# Patient Record
Sex: MALE | Race: BLACK OR AFRICAN AMERICAN | NOT HISPANIC OR LATINO | Employment: FULL TIME | URBAN - METROPOLITAN AREA
[De-identification: names, ages, dates, MRNs, and addresses within clinical notes are randomized per-mention and may not be internally consistent; named-entity substitution may affect disease eponyms.]

---

## 2017-04-11 DIAGNOSIS — M25.511 ACUTE PAIN OF RIGHT SHOULDER: Primary | ICD-10-CM

## 2017-04-13 ENCOUNTER — OFFICE VISIT (OUTPATIENT)
Dept: ORTHOPEDICS | Facility: CLINIC | Age: 57
End: 2017-04-13
Payer: OTHER MISCELLANEOUS

## 2017-04-13 ENCOUNTER — HOSPITAL ENCOUNTER (OUTPATIENT)
Dept: RADIOLOGY | Facility: HOSPITAL | Age: 57
Discharge: HOME | End: 2017-04-13
Attending: ORTHOPAEDIC SURGERY
Payer: OTHER MISCELLANEOUS

## 2017-04-13 VITALS — WEIGHT: 197 LBS | HEIGHT: 69 IN | BODY MASS INDEX: 29.18 KG/M2

## 2017-04-13 DIAGNOSIS — M25.511 ACUTE PAIN OF RIGHT SHOULDER: ICD-10-CM

## 2017-04-13 DIAGNOSIS — M77.8 RIGHT SHOULDER TENDINITIS: Primary | ICD-10-CM

## 2017-04-13 PROCEDURE — 73030 X-RAY EXAM OF SHOULDER: CPT | Mod: 26,RT,, | Performed by: RADIOLOGY

## 2017-04-13 PROCEDURE — 99999 PR PBB SHADOW E&M-EST. PATIENT-LVL III: CPT | Mod: PBBFAC,,, | Performed by: ORTHOPAEDIC SURGERY

## 2017-04-13 PROCEDURE — 73030 X-RAY EXAM OF SHOULDER: CPT | Mod: TC,PO,RT

## 2017-04-13 PROCEDURE — 99203 OFFICE O/P NEW LOW 30 MIN: CPT | Mod: S$GLB,,, | Performed by: ORTHOPAEDIC SURGERY

## 2017-04-13 RX ORDER — ACETAMINOPHEN AND CODEINE PHOSPHATE 300; 30 MG/1; MG/1
1 TABLET ORAL
Status: ON HOLD | COMMUNITY
End: 2017-05-23 | Stop reason: HOSPADM
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
## 2017-04-13 NOTE — MR AVS SNAPSHOT
"    [facility]  [address]  [place] [address]  Phone: [phone]                  Devon Leon   2017 10:00 AM   Office Visit    Description:  Male : 1960   Provider:  Isaac Carpenter MD   Department:  [place] [facility]           Reason for Visit     Right Shoulder - Pain           Diagnoses this Visit        Comments    Right shoulder tendinitis    -  Primary            To Do List           Future Appointments        Provider Department Dept Phone    2017 10:00 AM Isaac Carpenter MD [facility] [phone]      Goals (5 Years of Data)     None      Follow-Up and Disposition     Return in about 3 weeks (around 2017).      [facility] [place] [facility]     [facility] -  Assistance  Unless otherwise directed by your provider, please contact [facility], our nurse care line that is available for  assistance.     Registered nurses in the [facility] provide: appointment scheduling, clinical advisement, health education, and other advisory services.  Call: [phone] (toll free)               Medications           Message regarding Medications     Verify the changes and/or additions to your medication regime listed below are the same as discussed with your clinician today.  If any of these changes or additions are incorrect, please notify your healthcare provider.             Verify that the below list of medications is an accurate representation of the medications you are currently taking.  If none reported, the list may be blank. If incorrect, please contact your healthcare provider. Carry this list with you in case of emergency.           Current Medications     acetaminophen-codeine 300-30mg (TYLENOL #3) 300-30 mg Tab Take 1 tablet by mouth every 6 to 8 hours as needed.           Clinical Reference Information           Your Vitals Were     Height Weight BMI          5' 9" (1.753 m) 89.4 kg (197 lb) 29.09 " kg/m2        Allergies as of 4/13/2017     No Known Allergies      Immunizations Administered on Date of Encounter - 4/13/2017     None      Orders Placed During Today's Visit      Normal Orders This Visit    Ambulatory Referral to Physical/Occupational Therapy       [facility] Sign-Up     Activating your [facility] account is as easy as 1-2-3!     1) Visit [email or web address], select Sign Up Now, enter this activation code and your date of birth, then select Next.  [email or web address]  Expires: 5/28/2017 11:00 AM      2) Create a username and password to use when you visit [facility] in the future and select a security question in case you lose your password and select Next.    3) Enter your e-mail address and click Sign Up!    Additional Information  If you have questions, please e-mail [email or web address] or call [phone] to talk to our [facility] staff. Remember, [facility] is NOT to be used for urgent needs. For medical emergencies, dial 911.         Language Assistance Services     ATTENTION: Language assistance services are available, free of charge. Please call [phone].      ATENCIÓN: Si habla español, tiene a russ disposición servicios gratuitos de asistencia lingüística. Llame al [phone].     CHÚ Ý: N?u b?n nói Ti?ng Vi?t, có các d?ch v? h? tr? ngôn ng? mi?n phí dành cho b?n. G?i s? [phone].         [facility] complies with applicable Federal civil rights laws and does not discriminate on the basis of race, color, national origin, age, disability, or sex.

## 2017-05-04 ENCOUNTER — HOSPITAL ENCOUNTER (OUTPATIENT)
Dept: RADIOLOGY | Facility: HOSPITAL | Age: 57
Discharge: HOME | End: 2017-05-04
Attending: ORTHOPAEDIC SURGERY
Payer: OTHER MISCELLANEOUS

## 2017-05-04 ENCOUNTER — OFFICE VISIT (OUTPATIENT)
Dept: ORTHOPEDICS | Facility: CLINIC | Age: 57
End: 2017-05-04
Payer: OTHER MISCELLANEOUS

## 2017-05-04 VITALS — BODY MASS INDEX: 29.18 KG/M2 | WEIGHT: 197 LBS | HEIGHT: 69 IN

## 2017-05-04 DIAGNOSIS — M25.511 ACUTE PAIN OF RIGHT SHOULDER: Primary | ICD-10-CM

## 2017-05-04 DIAGNOSIS — M25.511 ACUTE PAIN OF RIGHT SHOULDER: ICD-10-CM

## 2017-05-04 DIAGNOSIS — M75.41 IMPINGEMENT SYNDROME OF RIGHT SHOULDER: ICD-10-CM

## 2017-05-04 PROCEDURE — 99213 OFFICE O/P EST LOW 20 MIN: CPT | Mod: S$GLB,,, | Performed by: ORTHOPAEDIC SURGERY

## 2017-05-04 PROCEDURE — 73221 MRI JOINT UPR EXTREM W/O DYE: CPT | Mod: TC,PO,RT

## 2017-05-04 PROCEDURE — 73221 MRI JOINT UPR EXTREM W/O DYE: CPT | Mod: 26,RT,, | Performed by: RADIOLOGY

## 2017-05-04 PROCEDURE — 99999 PR PBB SHADOW E&M-EST. PATIENT-LVL II: CPT | Mod: 25,PBBFAC,, | Performed by: ORTHOPAEDIC SURGERY

## 2017-05-04 NOTE — PROGRESS NOTES
"DATE: 5/4/2017  PATIENT: Devon Leon    Attending Physician: Isaac Carpenter M.D.    HISTORY:  Devon Leon is a 57 y.o. male who returns for follow up evaluation of  his right shoulder.  He was seen on 4/13/17 and diagnosed with traumatic impingement.  He has been going to physical therapy and notes only minimal improvement.  He states the first therapy sessions hurt significantly and the remaining sessions have not helped.  Pain is reported at 8/10 today.  He is remain out of work since his injury.    PMH/PSH/FamHx/SocHx:  Reviewed and unchanged from prior visit    ROS:  Constitution: Negative for chills, fever, and sweats. Negative for unexplained weight loss.  HENT: Negative for headaches and blurry vision.   Cardiovascular: Negative for chest pain, irregular heartbeat, leg swelling and palpitations.   Respiratory: Negative for cough and shortness of breath.   Gastrointestinal: Negative for abdominal pain, heartburn, nausea and vomiting.   Genitourinary: Negative for bladder incontinence and dysuria.   Musculoskeletal: Negative for systemic arthritis, joint swelling, muscle weakness and myalgias.   Neurological: Negative for numbness.   Psychiatric/Behavioral: Negative for depression.   Endocrine: Negative for polyuria.   Hematologic/Lymphatic: Negative for bleeding disorders.  Skin: Negative for poor wound healing.       EXAM:  Ht 5' 9" (1.753 m)  Wt 89.4 kg (197 lb)  BMI 29.09 kg/m2  Devon Leon is a well developed, well nourished male in no acute distress. Physical examination of the right shoulder evaluated the following:    Inspection, palpation and ROM of the cervical spine  Disc compression testing bilaterally  Inspection for swelling, ecchymosis, erythema, deformity and atrophy  Tenderness to palpation of the soft tissue and bony structures  Active and passive range of motion  Sensation of the shoulder and upper extremity  Motor strength in the deltoid, supraspinatus, internal rotators and " external rotators  Impingement, apprehension, relocation and Speed's tests  Upper extremity vascular exam (skin temp,color, capillary refill)  Inspection for pseudomotor signs    Remarkable findings included:  Mild diffuse tenderness about the shoulder.  Range of motion full to forward elevation, abduction, internal and external rotation.  Motor strength nearly 5/5 in the supraspinatus and 5 minus/5 in the external rotators.  Markedly impingement sign still on exam        IMAGING:   No x-rays are performed today.    ASSESSMENT:  Traumatic impingement, possible rotator cuff tear right shoulder    PLAN:  The implications of the patient's evolution of symptoms and findings were explained to the patient in detail.  As Devon has not responded to therapy, I recommend an MRI to rule out a small full-thickness rotator cuff tear.   He'll remain out of work pending MRI results.  Therapy will also be placed on hold pending MRI results.Follow-up after MRI has been performed discussed results and further treatment recommendations.           This note was dictated using voice recognition software and may contain grammatical errors

## 2017-05-08 ENCOUNTER — OFFICE VISIT (OUTPATIENT)
Dept: ORTHOPEDICS | Facility: CLINIC | Age: 57
End: 2017-05-08
Payer: OTHER MISCELLANEOUS

## 2017-05-08 VITALS — WEIGHT: 197 LBS | BODY MASS INDEX: 29.18 KG/M2 | HEIGHT: 69 IN

## 2017-05-08 DIAGNOSIS — M75.121 COMPLETE TEAR OF RIGHT ROTATOR CUFF: Primary | ICD-10-CM

## 2017-05-08 PROCEDURE — 99999 PR PBB SHADOW E&M-EST. PATIENT-LVL III: CPT | Mod: PBBFAC,,, | Performed by: ORTHOPAEDIC SURGERY

## 2017-05-08 PROCEDURE — 99214 OFFICE O/P EST MOD 30 MIN: CPT | Mod: S$GLB,,, | Performed by: ORTHOPAEDIC SURGERY

## 2017-05-08 RX ORDER — MUPIROCIN 20 MG/G
1 OINTMENT TOPICAL
Status: CANCELLED | OUTPATIENT
Start: 2017-05-08

## 2017-05-08 NOTE — PROGRESS NOTES
"DATE: 5/8/2017  PATIENT: Devon Leon    Attending Physician: Isaac Carpenter M.D.    HISTORY:  Devon Leon is a 57 y.o. male who returns for follow up evaluation of  his right shoulder.  He did undergo an MRI which showed a retracted full-thickness rotator cuff tear.  He reports that his symptoms are unchanged and pain is still reported at 8/10 today.  He has remained out of work since his injury.    PMH/PSH/FamHx/SocHx:  Reviewed and unchanged from prior visit    ROS:  Constitution: Negative for chills, fever, and sweats. Negative for unexplained weight loss.  HENT: Negative for headaches and blurry vision.   Cardiovascular: Negative for chest pain, irregular heartbeat, leg swelling and palpitations.   Respiratory: Negative for cough and shortness of breath.   Gastrointestinal: Negative for abdominal pain, heartburn, nausea and vomiting.   Genitourinary: Negative for bladder incontinence and dysuria.   Musculoskeletal: Negative for systemic arthritis, joint swelling, muscle weakness and myalgias.   Neurological: Negative for numbness.   Psychiatric/Behavioral: Negative for depression.   Endocrine: Negative for polyuria.   Hematologic/Lymphatic: Negative for bleeding disorders.  Skin: Negative for poor wound healing.       EXAM:  Ht 5' 9" (1.753 m)  Wt 89.4 kg (197 lb)  BMI 29.09 kg/m2  Consitiutional: Well developed, well nourished male in no acute distress.  HEENT: Normocephalic, atraumatic.   Neck: Supple.  Chest: Breath sounds equal.  Cor: Regular, rate and rhythm.   Abdomen: Soft, nontender, nondistended. Without rebound or guarding.  Neuro: Alert, oriented x3. Nonfocal.   Rectal/GYN: Deferred.  Physical examination of the right shoulder evaluated the following:     Inspection, palpation and ROM of the cervical spine  Disc compression testing bilaterally  Inspection for swelling, ecchymosis, erythema, deformity and atrophy  Tenderness to palpation of the soft tissue and bony structures  Active and " passive range of motion  Sensation of the shoulder and upper extremity  Motor strength in the deltoid, supraspinatus, internal rotators and external rotators  Impingement, apprehension, relocation and Speed's tests  Upper extremity vascular exam (skin temp,color, capillary refill)  Inspection for pseudomotor signs     Remarkable findings included:  Mild diffuse tenderness about the shoulder. Range of motion full to forward elevation, abduction, internal and external rotation. Motor strength nearly 5/5 in the supraspinatus and 5 minus/5 in the external rotators.  impingement sign still present on exam       IMAGING:   MRI is personally reviewed and consistent with a report.  Retracted full-thickness rotator cuff tear identified    ASSESSMENT:  Full thickness rotator cuff tear right shoulder    PLAN:  The implications of the patient's evolution of symptoms and findings were explained to the patient in detail.. given the MRI findings and acute nature of the injury, I've recommended arthroscopic rotator cuff repair possible arthroscopic subacromial decompression right shoulder. The surgical procedure including potential risks and benefits was discussed in detail. Specific risks discussed included but were not limited to: infection, the possibility of a negative arthroscopic exam, arthroscopy failing to reveal any surgically treatable abnormalities (such as arthritic changes), failure to relieve symptoms, recurrence, nerve injury resulting in permanent numbness or weakness, blood vessel damage requiring repair and/or hospitalization, permanent stiffness, failure to achieve full joint mobility, permanent weakness, extensive and time consuming therapy, deep venous thrombosis and pulmonary embolism, and anesthesia related risks to be discussed with the anesthesiologist.. All questions answered and the patient wishes to  proceed with arthroscopy.  Informed consent obtained and signed.  He'll remain out of work.  Follow-up at  the time of surgery.          This note was dictated using voice recognition software and may contain grammatical errors

## 2017-05-22 ENCOUNTER — ANESTHESIA EVENT (OUTPATIENT)
Dept: SURGERY | Facility: HOSPITAL | Age: 57
End: 2017-05-22
Payer: OTHER MISCELLANEOUS

## 2017-05-23 ENCOUNTER — HOSPITAL ENCOUNTER (OUTPATIENT)
Facility: HOSPITAL | Age: 57
Discharge: HOME | End: 2017-05-23
Attending: ORTHOPAEDIC SURGERY | Admitting: ORTHOPAEDIC SURGERY
Payer: OTHER MISCELLANEOUS

## 2017-05-23 ENCOUNTER — ANESTHESIA (OUTPATIENT)
Dept: SURGERY | Facility: HOSPITAL | Age: 57
End: 2017-05-23
Payer: OTHER MISCELLANEOUS

## 2017-05-23 DIAGNOSIS — M75.121 COMPLETE TEAR OF RIGHT ROTATOR CUFF: ICD-10-CM

## 2017-05-23 DIAGNOSIS — M75.41 IMPINGEMENT SYNDROME OF RIGHT SHOULDER: Primary | ICD-10-CM

## 2017-05-23 DIAGNOSIS — M75.101 ROTATOR CUFF TEAR, RIGHT: ICD-10-CM

## 2017-05-23 PROCEDURE — 63600175 PHARM REV CODE 636 W HCPCS: Mod: PO | Performed by: NURSE ANESTHETIST, CERTIFIED REGISTERED

## 2017-05-23 PROCEDURE — 37000009 HC ANESTHESIA EA ADD 15 MINS: Mod: PO | Performed by: ORTHOPAEDIC SURGERY

## 2017-05-23 PROCEDURE — 63600175 PHARM REV CODE 636 W HCPCS: Mod: PO | Performed by: ORTHOPAEDIC SURGERY

## 2017-05-23 PROCEDURE — D9220A PRA ANESTHESIA: Mod: ,,, | Performed by: ANESTHESIOLOGY

## 2017-05-23 PROCEDURE — 36000710: Mod: PO | Performed by: ORTHOPAEDIC SURGERY

## 2017-05-23 PROCEDURE — C1713 ANCHOR/SCREW BN/BN,TIS/BN: HCPCS | Mod: PO | Performed by: ORTHOPAEDIC SURGERY

## 2017-05-23 PROCEDURE — 25000003 PHARM REV CODE 250: Mod: PO | Performed by: ANESTHESIOLOGY

## 2017-05-23 PROCEDURE — 25000003 PHARM REV CODE 250: Mod: PO | Performed by: NURSE ANESTHETIST, CERTIFIED REGISTERED

## 2017-05-23 PROCEDURE — 63600175 PHARM REV CODE 636 W HCPCS: Mod: PO | Performed by: ANESTHESIOLOGY

## 2017-05-23 PROCEDURE — 76942 ECHO GUIDE FOR BIOPSY: CPT | Mod: 26,,, | Performed by: ANESTHESIOLOGY

## 2017-05-23 PROCEDURE — 25000003 PHARM REV CODE 250: Mod: PO | Performed by: ORTHOPAEDIC SURGERY

## 2017-05-23 PROCEDURE — 64416 NJX AA&/STRD BRCH PL NFS IMG: CPT | Mod: 59,RT,, | Performed by: ANESTHESIOLOGY

## 2017-05-23 PROCEDURE — 29826 SHO ARTHRS SRG DECOMPRESSION: CPT | Mod: RT,,, | Performed by: ORTHOPAEDIC SURGERY

## 2017-05-23 PROCEDURE — 71000033 HC RECOVERY, INTIAL HOUR: Mod: PO | Performed by: ORTHOPAEDIC SURGERY

## 2017-05-23 PROCEDURE — 27201423 OPTIME MED/SURG SUP & DEVICES STERILE SUPPLY: Mod: PO | Performed by: ORTHOPAEDIC SURGERY

## 2017-05-23 PROCEDURE — 27200664 HC NERVE BLOCK COMPLETE KIT: Mod: PO | Performed by: ANESTHESIOLOGY

## 2017-05-23 PROCEDURE — 37000008 HC ANESTHESIA 1ST 15 MINUTES: Mod: PO | Performed by: ORTHOPAEDIC SURGERY

## 2017-05-23 PROCEDURE — 76942 ECHO GUIDE FOR BIOPSY: CPT | Mod: PO | Performed by: ANESTHESIOLOGY

## 2017-05-23 PROCEDURE — 29827 SHO ARTHRS SRG RT8TR CUF RPR: CPT | Mod: RT,,, | Performed by: ORTHOPAEDIC SURGERY

## 2017-05-23 PROCEDURE — 36000711: Mod: PO | Performed by: ORTHOPAEDIC SURGERY

## 2017-05-23 DEVICE — ANCHOR HEALICOIL 5.5MM: Type: IMPLANTABLE DEVICE | Site: SHOULDER | Status: FUNCTIONAL

## 2017-05-23 DEVICE — ANCHOR SUTURE POPLOK 4.5MM: Type: IMPLANTABLE DEVICE | Site: SHOULDER | Status: FUNCTIONAL

## 2017-05-23 RX ORDER — FENTANYL CITRATE 50 UG/ML
50 INJECTION, SOLUTION INTRAMUSCULAR; INTRAVENOUS EVERY 5 MIN PRN
Status: DISCONTINUED | OUTPATIENT
Start: 2017-05-23 | End: 2017-05-23 | Stop reason: HOSPADM

## 2017-05-23 RX ORDER — KETAMINE HYDROCHLORIDE 100 MG/ML
INJECTION, SOLUTION INTRAMUSCULAR; INTRAVENOUS
Status: DISCONTINUED | OUTPATIENT
Start: 2017-05-23 | End: 2017-05-23

## 2017-05-23 RX ORDER — SODIUM CHLORIDE, SODIUM LACTATE, POTASSIUM CHLORIDE, CALCIUM CHLORIDE 600; 310; 30; 20 MG/100ML; MG/100ML; MG/100ML; MG/100ML
INJECTION, SOLUTION INTRAVENOUS CONTINUOUS
Status: DISCONTINUED | OUTPATIENT
Start: 2017-05-23 | End: 2017-05-23 | Stop reason: HOSPADM

## 2017-05-23 RX ORDER — EPINEPHRINE 1 MG/ML
INJECTION INTRAMUSCULAR; INTRAVENOUS; SUBCUTANEOUS
Status: DISCONTINUED | OUTPATIENT
Start: 2017-05-23 | End: 2017-05-23 | Stop reason: HOSPADM

## 2017-05-23 RX ORDER — GLYCOPYRROLATE 0.2 MG/ML
INJECTION INTRAMUSCULAR; INTRAVENOUS
Status: DISCONTINUED | OUTPATIENT
Start: 2017-05-23 | End: 2017-05-23

## 2017-05-23 RX ORDER — NEOSTIGMINE METHYLSULFATE 1 MG/ML
INJECTION, SOLUTION INTRAVENOUS
Status: DISCONTINUED | OUTPATIENT
Start: 2017-05-23 | End: 2017-05-23

## 2017-05-23 RX ORDER — FENTANYL CITRATE 50 UG/ML
25 INJECTION, SOLUTION INTRAMUSCULAR; INTRAVENOUS EVERY 5 MIN PRN
Status: DISCONTINUED | OUTPATIENT
Start: 2017-05-23 | End: 2017-05-23 | Stop reason: HOSPADM

## 2017-05-23 RX ORDER — FENTANYL CITRATE 50 UG/ML
INJECTION, SOLUTION INTRAMUSCULAR; INTRAVENOUS
Status: DISCONTINUED | OUTPATIENT
Start: 2017-05-23 | End: 2017-05-23

## 2017-05-23 RX ORDER — OXYCODONE AND ACETAMINOPHEN 10; 325 MG/1; MG/1
1 TABLET ORAL
Qty: 60 TABLET | Refills: 0 | Status: SHIPPED | OUTPATIENT
Start: 2017-05-23

## 2017-05-23 RX ORDER — LIDOCAINE HCL/PF 100 MG/5ML
SYRINGE (ML) INTRAVENOUS
Status: DISCONTINUED | OUTPATIENT
Start: 2017-05-23 | End: 2017-05-23

## 2017-05-23 RX ORDER — ROPIVACAINE HYDROCHLORIDE 5 MG/ML
INJECTION, SOLUTION EPIDURAL; INFILTRATION; PERINEURAL
Status: DISCONTINUED | OUTPATIENT
Start: 2017-05-23 | End: 2017-05-23

## 2017-05-23 RX ORDER — LIDOCAINE HYDROCHLORIDE 10 MG/ML
1 INJECTION, SOLUTION EPIDURAL; INFILTRATION; INTRACAUDAL; PERINEURAL ONCE AS NEEDED
Status: COMPLETED | OUTPATIENT
Start: 2017-05-23 | End: 2017-05-23

## 2017-05-23 RX ORDER — ROCURONIUM BROMIDE 10 MG/ML
INJECTION, SOLUTION INTRAVENOUS
Status: DISCONTINUED | OUTPATIENT
Start: 2017-05-23 | End: 2017-05-23

## 2017-05-23 RX ORDER — ACETAMINOPHEN 10 MG/ML
INJECTION, SOLUTION INTRAVENOUS
Status: DISCONTINUED | OUTPATIENT
Start: 2017-05-23 | End: 2017-05-23

## 2017-05-23 RX ORDER — OXYCODONE HYDROCHLORIDE 5 MG/1
10 TABLET ORAL EVERY 4 HOURS PRN
Status: DISCONTINUED | OUTPATIENT
Start: 2017-05-23 | End: 2017-05-23 | Stop reason: HOSPADM

## 2017-05-23 RX ORDER — DEXAMETHASONE SODIUM PHOSPHATE 4 MG/ML
8 INJECTION, SOLUTION INTRA-ARTICULAR; INTRALESIONAL; INTRAMUSCULAR; INTRAVENOUS; SOFT TISSUE
Status: COMPLETED | OUTPATIENT
Start: 2017-05-23 | End: 2017-05-23

## 2017-05-23 RX ORDER — MUPIROCIN 20 MG/G
1 OINTMENT TOPICAL
Status: COMPLETED | OUTPATIENT
Start: 2017-05-23 | End: 2017-05-23

## 2017-05-23 RX ORDER — MIDAZOLAM HYDROCHLORIDE 5 MG/ML
2 INJECTION INTRAMUSCULAR; INTRAVENOUS ONCE AS NEEDED
Status: COMPLETED | OUTPATIENT
Start: 2017-05-23 | End: 2017-05-23

## 2017-05-23 RX ORDER — SODIUM CHLORIDE 0.9 % (FLUSH) 0.9 %
3 SYRINGE (ML) INJECTION
Status: DISCONTINUED | OUTPATIENT
Start: 2017-05-23 | End: 2017-05-23 | Stop reason: HOSPADM

## 2017-05-23 RX ORDER — ONDANSETRON 2 MG/ML
INJECTION INTRAMUSCULAR; INTRAVENOUS
Status: DISCONTINUED | OUTPATIENT
Start: 2017-05-23 | End: 2017-05-23

## 2017-05-23 RX ORDER — PROPOFOL 10 MG/ML
VIAL (ML) INTRAVENOUS
Status: DISCONTINUED | OUTPATIENT
Start: 2017-05-23 | End: 2017-05-23

## 2017-05-23 RX ADMIN — MIDAZOLAM HYDROCHLORIDE 2 MG: 5 INJECTION, SOLUTION INTRAMUSCULAR; INTRAVENOUS at 02:05

## 2017-05-23 RX ADMIN — PROPOFOL 200 MG: 10 INJECTION, EMULSION INTRAVENOUS at 03:05

## 2017-05-23 RX ADMIN — GLYCOPYRROLATE 0.4 MG: 0.2 INJECTION, SOLUTION INTRAMUSCULAR; INTRAVENOUS at 05:05

## 2017-05-23 RX ADMIN — ONDANSETRON 4 MG: 2 INJECTION, SOLUTION INTRAMUSCULAR; INTRAVENOUS at 03:05

## 2017-05-23 RX ADMIN — FENTANYL CITRATE 50 MCG: 50 INJECTION, SOLUTION INTRAMUSCULAR; INTRAVENOUS at 03:05

## 2017-05-23 RX ADMIN — LIDOCAINE HYDROCHLORIDE 100 SYRINGE: 20 INJECTION PARENTERAL at 03:05

## 2017-05-23 RX ADMIN — LIDOCAINE HYDROCHLORIDE: 10 INJECTION, SOLUTION EPIDURAL; INFILTRATION; INTRACAUDAL; PERINEURAL at 01:05

## 2017-05-23 RX ADMIN — DEXAMETHASONE SODIUM PHOSPHATE 8 MG: 4 INJECTION, SOLUTION INTRAMUSCULAR; INTRAVENOUS at 01:05

## 2017-05-23 RX ADMIN — FENTANYL CITRATE 50 MCG: 50 INJECTION INTRAMUSCULAR; INTRAVENOUS at 02:05

## 2017-05-23 RX ADMIN — DEXTROSE 2 G: 50 INJECTION, SOLUTION INTRAVENOUS at 03:05

## 2017-05-23 RX ADMIN — KETAMINE HYDROCHLORIDE 25 MG: 100 INJECTION, SOLUTION, CONCENTRATE INTRAMUSCULAR; INTRAVENOUS at 03:05

## 2017-05-23 RX ADMIN — ROPIVACAINE HYDROCHLORIDE: 2 INJECTION, SOLUTION EPIDURAL; INFILTRATION at 06:05

## 2017-05-23 RX ADMIN — GLYCOPYRROLATE 0.2 MG: 0.2 INJECTION, SOLUTION INTRAMUSCULAR; INTRAVENOUS at 03:05

## 2017-05-23 RX ADMIN — ACETAMINOPHEN 1000 MG: 10 INJECTION, SOLUTION INTRAVENOUS at 03:05

## 2017-05-23 RX ADMIN — MUPIROCIN 1 G: 20 OINTMENT TOPICAL at 01:05

## 2017-05-23 RX ADMIN — SODIUM CHLORIDE, SODIUM LACTATE, POTASSIUM CHLORIDE, AND CALCIUM CHLORIDE: .6; .31; .03; .02 INJECTION, SOLUTION INTRAVENOUS at 03:05

## 2017-05-23 RX ADMIN — ROCURONIUM BROMIDE 40 MG: 10 INJECTION, SOLUTION INTRAVENOUS at 03:05

## 2017-05-23 RX ADMIN — SODIUM CHLORIDE, SODIUM LACTATE, POTASSIUM CHLORIDE, AND CALCIUM CHLORIDE: .6; .31; .03; .02 INJECTION, SOLUTION INTRAVENOUS at 01:05

## 2017-05-23 RX ADMIN — NEOSTIGMINE METHYLSULFATE 5 MG: 1 INJECTION INTRAVENOUS at 05:05

## 2017-05-23 RX ADMIN — ROPIVACAINE HYDROCHLORIDE 30 ML: 5 INJECTION, SOLUTION EPIDURAL; INFILTRATION; PERINEURAL at 02:05

## 2017-05-23 NOTE — DISCHARGE INSTRUCTIONS
Discharge Instructions: After Your Surgery  Youve just had surgery. During surgery you were given medicine called anesthesia to keep you relaxed and free of pain. After surgery you may have some pain or nausea. This is common. Here are some tips for feeling better and getting well after surgery.     Stay on schedule with your medication.   Going home  Your doctor or nurse will show you how to take care of yourself when you go home. He or she will also answer your questions. Have an adult family member or friend drive you home. For the first 24 hours after your surgery:  · Do not drive or use heavy equipment.  · Do not make important decisions or sign legal papers.  · Do not drink alcohol.  · Have someone stay with you, if needed. He or she can watch for problems and help keep you safe.  Be sure to go to all follow-up visits with your doctor. And rest after your surgery for as long as your doctor tells you to.  Coping with pain  If you have pain after surgery, pain medicine will help you feel better. Take it as told, before pain becomes severe. Also, ask your doctor or pharmacist about other ways to control pain. This might be with heat, ice, or relaxation. And follow any other instructions your surgeon or nurse gives you.  Tips for taking pain medicine  To get the best relief possible, remember these points:  · Pain medicines can upset your stomach. Taking them with a little food may help.  · Most pain relievers taken by mouth need at least 20 to 30 minutes to start to work.  · Taking medicine on a schedule can help you remember to take it. Try to time your medicine so that you can take it before starting an activity. This might be before you get dressed, go for a walk, or sit down for dinner.  · Constipation is a common side effect of pain medicines. Call your doctor before taking any medicines such as laxatives or stool softeners to help ease constipation. Also ask if you should skip any foods. Drinking lots of  fluids and eating foods such as fruits and vegetables that are high in fiber can also help. Remember, do not take laxatives unless your surgeon has prescribed them.  · Drinking alcohol and taking pain medicine can cause dizziness and slow your breathing. It can even be deadly. Do not drink alcohol while taking pain medicine.  · Pain medicine can make you react more slowly to things. Do not drive or run machinery while taking pain medicine.  Your health care provider may tell you to take acetaminophen to help ease your pain. Ask him or her how much you are supposed to take each day. Acetaminophen or other pain relievers may interact with your prescription medicines or other over-the-counter (OTC) drugs. Some prescription medicines have acetaminophen and other ingredients. Using both prescription and OTC acetaminophen for pain can cause you to overdose. Read the labels on your OTC medicines with care. This will help you to clearly know the list of ingredients, how much to take, and any warnings. It may also help you not take too much acetaminophen. If you have questions or do not understand the information, ask your pharmacist or health care provider to explain it to you before you take the OTC medicine.  Managing nausea  Some people have an upset stomach after surgery. This is often because of anesthesia, pain, or pain medicine, or the stress of surgery. These tips will help you handle nausea and eat healthy foods as you get better. If you were on a special food plan before surgery, ask your doctor if you should follow it while you get better. These tips may help:  · Do not push yourself to eat. Your body will tell you when to eat and how much.  · Start off with clear liquids and soup. They are easier to digest.  · Next try semi-solid foods, such as mashed potatoes, applesauce, and gelatin, as you feel ready.  · Slowly move to solid foods. Dont eat fatty, rich, or spicy foods at first.  · Do not force yourself to  have 3 large meals a day. Instead eat smaller amounts more often.  · Take pain medicines with a small amount of solid food, such as crackers or toast, to avoid nausea.     Call your surgeon if  · You still have pain an hour after taking medicine. The medicine may not be strong enough.  · You feel too sleepy, dizzy, or groggy. The medicine may be too strong.  · You have side effects like nausea, vomiting, or skin changes, such as rash, itching, or hives.       If you have obstructive sleep apnea  You were given anesthesia medicine during surgery to keep you comfortable and free of pain. After surgery, you may have more apnea spells because of this medicine and other medicines you were given. The spells may last longer than usual.   At home:  · Keep using the continuous positive airway pressure (CPAP) device when you sleep. Unless your health care provider tells you not to, use it when you sleep, day or night. CPAP is a common device used to treat obstructive sleep apnea.  · Talk with your provider before taking any pain medicine, muscle relaxants, or sedatives. Your provider will tell you about the possible dangers of taking these medicines.  Date Last Reviewed: 10/16/2014  © 2721-5481 [employer]. [address] [place] [address], [place] [address]. All rights reserved. This information is not intended as a substitute for professional medical care. Always follow your healthcare professional's instructions.           [facility] [address] [place] [address]  [phone]                   Dr. Carpenter's Postoperative Instructions   for Shoulder Surgery                 Your Surgery Included:   Open               Arthroscopic [] Instability Repair         [x] Diagnostic   [] Rotator Cuff Repair      [] Lysis of Adhesions / Manipulation [] Distal Clavicle Resection      [] Debridement [] Biceps Tenodesis              [] Labrum  [] Ant [] Post [] Sup [] Inferior          [] Rotator Cuff []  Subscap[] Articular [] Bursal           [] Articular Surface   [] Glenoid [] Humeral Head   [] Contracture Release      [] Labral Repair [] Superior/SLAP [] Anterior  [] Posterior [] Fracture Fixation      [] Instability Repair/ Anterior Capsular Shift   [] Ant [] Post  [] AC Joint Reconstruction       [x] Rotator Cuff Repair [] Joint Replacement      [x] Subacromial Decompression /Acromioplasty             [] Hemiarthroplasty  [] Total Shoulder      [] Biceps Tenodesis      [] Biceps Tenotomy            [] Reverse Total Shoulder         [] Distal Clavicle Resection                [] Contracture/Capsular Release                    Call our office [phone] or [phone] if you experience any of the following or have urgent questions:       Excessive bleeding or pus like drainage at the incision site       Uncontrollable pain not relieved by pain medication       Excessive swelling or redness at the incision site       Fever above 101.5 degrees not controlled with Tylenol or Motrin       Shortness of Breath       Any foul odor or blistering from the surgery site        1.   Pain Management: A cold therapy cuff, pain medications, local injections, and in some cases, regional anesthesia injections are used to manage your post-operative pain. The decision to use each of these options is based on their risks and benefits.     Medications: You were given one or more of the following medication prescriptions before leaving the hospital. Have the prescriptions filled at a pharmacy on your way home and follow the instructions on the bottles. If you need a refill, please call your pharmacy.      Narcotic Medication (usually Vicodin ES, Lortab, Percocet or Nucynta): Begin taking the medication before your shoulder starts to hurt. Some patients do not like to take any medication, but if you wait until your pain is severe before taking, you will be very uncomfortable for several hours waiting for the narcotic to  work. Always take with food.     Nausea / Vomiting: If you develop post-operative nausea and vomiting, please contact the office and an anti-emetic, such as Zofran can be prescribed. Use this medication as directed.     Cold Therapy: You may have been sent home with a cold therapy unit and wrap for your shoulder. Fill with ice and water to the indicated fill line and use throughout the day for the first two days and then as needed to help relieve pain and control swelling. However, never place the cold pad directly against your skin. Place over the dressing or after the first dressing change over a T-shirt.     Regional Anesthesia Injections (Blocks): You may have been given a regional nerve block either before or after surgery. This may make your entire shoulder and upper extremity numb for 24-36 hours. You may have also had a catheter placed which will provide regional anesthesia for 48 hours. If after 36 hours (48 hours if you have had a catheter placed), you still do not have feeling in your shoulder, please contact the office.                   2.   Diet: Start with clear liquids and then eat a bland diet for the first day after surgery. Progress your diet as tolerated. Constipation may occur with Narcotic usage, contact our office if you are experiencing constipation.    3.   Activity: After you arrive at home, spend most of the first 24 hours resting in bed, on the couch, or in a reclining chair. Many patients feel more comfortable in a reclining chair or propped up in bed.  After the first 48 hours at home, slowly increase your activity level based upon your symptoms.    4.   Dressing Change: Remove the dressing on the 2nd post-operative day unless specifically instructed not to do so. It is normal for some blood to be seen on the dressings. It is also normal for you to see apparent bruising on the skin around your shoulder when you remove the dressing. Please place a bandaid over each portal site (i.e. over  "each suture). If present, leave the steri-strip tape across the incisions. If they fall off, it is OK, but do not peel them off. Let them fall off on their own. If you are concerned by the drainage or the appearance of your shoulder, please call the office.    5.   Showering: You may shower after the first dressing change if the wound is clean and dry. Please place a sheet of Saran wrap over the incisions to keep them dry. It is OK if a small amount of water gets on the incisions. However, do not let the wound soak/submerge in water until the sutures are removed.     6.   Shoulder Sling (with/without immobilizer strap): You may have been sent home with a sling/immobilizer. You may remove the sling when changing clothes or bathing. Make sure to wear the sling while sleeping unless instructed otherwise. You may remove at rest or for exercises.            [] You need to wear the sling/ immobilizer at all times except for dressing changes and               showers until your post-operative visit.          [x] You may use the sling/immobilizer for comfort only and may remove it as tolerated once                 the "block" wears off.    7.  Shoulder Exercises: Begin these exercises the first day after surgery in order to help you                 regain your motion and strength. You may do the following marked exercises for 5            minutes at least 3-5 times/day:     [] Shoulder shrugs - Shrug your shoulders up and down.     [] Pendulums - Bend forward allowing your arm to hang down in front of you. Gently swing your arm side-to-side and front to back. Also, move your arm in a circular motion, both clockwise and counter-clockwise.                                                                                                                                [] Passive abduction - Have a family member gently lift your arm away from your body bringing your elbow up to the level of your shoulder.                           "                                                                                           [] Shoulder rotation - With your arm at your side, have a family member gently rotate your arm internally and externally.                                                                                                                    [] Scapular retractions - (Squeeze shoulder blades together): Squeeze shoulder blades together while slightly pulling them down (do not shrug your shoulders upward); You can perform 10-15 reps, several times throughout the day, when seated at your desk, driving in the car, etc.                                                                                                                       [] Pulley exercises - Put a towel or rope across the top of a door. Stand facing the edge of the door. With the aid of the towel/rope, use your good arm to gently pull your operative arm up above shoulder height.     [x] Elbow motion - Straighten and bend your elbow with your arm at your side. Try to fully extend as well as touch your shoulder.     [x] Ball squeezes - use tennis ball or soft (nerf) ball for  strengthening                                                                                                                  8.  Physical Therapy: Physical therapy is an essential component to your recovery from surgery. Your physical therapy plan will be discussed at your first post-operative visit.    9.Work Status: [x] Out of work/gym until follow up appointment     [] May return to work light duty.     [] May return to work/gym without restrictions.      FIRST POSTOPERATIVE VISIT:  As scheduled. However, if you don't have a scheduled appointment or can't remember when it is, please contact the office.    Best wishes for a successful recovery!      Isaac Carpenter MD

## 2017-05-23 NOTE — ANESTHESIA PREPROCEDURE EVALUATION
05/23/2017  Devon Leon is a 57 y.o., male.    Anesthesia Evaluation      I have reviewed the Medications.     Review of Systems  Anesthesia Hx:  No problems with previous Anesthesia   Social:  Smoker 1/2 ppd   Cardiovascular:   Denies CAD.       Pulmonary:   Denies COPD.  Denies Asthma.  Denies Sleep Apnea.    Renal/:  Renal/ Normal     Hepatic/GI:  Hepatic/GI Normal    Neurological:  Neurology Normal    Endocrine:  Endocrine Normal        Physical Exam  General:  Well nourished    Airway/Jaw/Neck:  Airway Findings: Mouth Opening: Normal General Airway Assessment: Adult  Mallampati: III  Jaw/Neck Findings:  Neck ROM: Extension Decreased, Mild       Chest/Lungs:  Chest/Lungs Findings: Clear to auscultation, Normal Respiratory Rate     Heart/Vascular:  Heart Findings: Rate: Normal  Rhythm: Regular Rhythm  Sounds: Normal  Heart murmur: negative Vascular Findings: Normal (No carotid bruits.)       Mental Status:  Mental Status Findings:  Cooperative, Alert and Oriented         Anesthesia Plan  Type of Anesthesia, risks & benefits discussed:  Anesthesia Type:  general  Patient's Preference:   Intra-op Monitoring Plan:   Intra-op Monitoring Plan Comments:   Post Op Pain Control Plan: peripheral nerve block  Post Op Pain Control Plan Comments:   Induction:   IV  Beta Blocker:  Patient is not currently on a Beta-Blocker (No further documentation required).       Informed Consent: Patient understands risks and agrees with Anesthesia plan.  Questions answered. Anesthesia consent signed with patient.  ASA Score: 2     Day of Surgery Review of History & Physical:            Ready For Surgery From Anesthesia Perspective.

## 2017-05-23 NOTE — ANESTHESIA PROCEDURE NOTES
Peripheral    Patient location during procedure: pre-op   Block not for primary anesthetic.  Reason for block: at surgeon's request and post-op pain management   Post-op Pain Location: shoulder pain, right  Start time: 5/23/2017 2:05 PM  Timeout: 5/23/2017 2:03 PM   End time: 5/23/2017 2:21 PM  Surgery related to: right shoulder arthroscopy  Staffing  Anesthesiologist: SHARMIN WAITE  Performed: anesthesiologist   Preanesthetic Checklist  Completed: patient identified, site marked, surgical consent, pre-op evaluation, timeout performed, IV checked, risks and benefits discussed and monitors and equipment checked  Peripheral Block  Patient position: supine  Prep: ChloraPrep and site prepped and draped  Patient monitoring: heart rate, cardiac monitor, continuous pulse ox and frequent blood pressure checks  Block type: interscalene  Laterality: right  Injection technique: continuous  Needle  Needle type: Tuohy   Needle gauge: 18 G  Needle length: 2 in  Needle localization: nerve stimulator and ultrasound guidance  Needle insertion depth: 4 cm  Catheter type: non-stimulating  Catheter size: 20 G  Catheter at skin depth: 4 cm  Test dose: lidocaine 1.5% with Epi 1-to-200,000 and negative   -ultrasound image captured on disc.  Assessment  Injection assessment: negative aspiration, negative parasthesia and local visualized surrounding nerve  Paresthesia pain: none  Heart rate change: no  Slow fractionated injection: yes  Medications:  Bolus administered: 30 mL of 0.5 ropivacaine  Additional Notes  Secured with mastisol, dermaflex and steri-strips. Covered with tegaderm dressing.

## 2017-05-23 NOTE — INTERVAL H&P NOTE
The patient has been examined and the H&P has been reviewed:    I concur with the findings and no changes have occurred since H&P was written.    Anesthesia/Surgery risks, benefits and alternative options discussed and understood by patient/family.  However, we also did discuss superior capsule reconstruction and this procedure was added to the consent form.        Active Hospital Problems    Diagnosis  POA    Rotator cuff tear, right [M75.101]  Yes      Resolved Hospital Problems    Diagnosis Date Resolved POA   No resolved problems to display.

## 2017-05-23 NOTE — BRIEF OP NOTE
[facility]  Brief Operative Note     SUMMARY     Surgery Date: 5/23/2017     Surgeon(s) and Role:     * Isaac Carpenter MD - Primary    First Assistant: SHAN Francis    Pre-op Diagnosis:  Tear of right rotator cuff, unspecified tear extent [M75.101]    Post-op Diagnosis:  Tear of right rotator cuff, unspecified tear extent [M75.101]    Procedure(s) (LRB):  REPAIR ROTATOR CUFF ARTHROSCOPIC (Right)  ARTHROSCOPY-SHOULDER WITH SUBACROMIAL DECOMPRESSION, possible superior capsule reconstruction (Right)    Anesthesia: Choice    Description of the findings of the procedure: See dictated Op Note.    Findings/Key Components: See dictated Op Note.    Estimated Blood Loss: * Case end time is documented earlier than case start time so the value cannot be calculated. *         Specimens:   Specimen (12h ago through future)    None          Discharge Note    SUMMARY     Admit Date: 5/23/2017    Discharge Date and Time: No discharge date for patient encounter.    Attending Physician: Isaac Carpenter MD     Discharge Provider: Isaac Carpenter    Final Diagnosis: Tear of right rotator cuff, unspecified tear extent [M75.101]    Outcome of Hospitalization, Treatment, Procedure, or Surgery:    Patient admitted for outpatient procedure, procedure tolerated well, patient discharged home.    Disposition: Home or Self Care    Follow Up/Patient Instructions:   Follow-up Information     Isaac Carpenter MD. Schedule an appointment as soon as possible for a visit on 6/1/2017.    Specialties:  Sports Medicine, Orthopedic Surgery  Why:  For suture removal  Contact information:  [address] [place] [address]  [phone]                   Medications:  Reconciled Home Medications:   Current Discharge Medication List      START taking these medications    Details   oxycodone-acetaminophen (PERCOCET)  mg per tablet Take 1 tablet by mouth every 4 to 6 hours as needed for Pain.  Qty: 60 tablet, Refills:  0         STOP taking these medications       acetaminophen-codeine 300-30mg (TYLENOL #3) 300-30 mg Tab Comments:   Reason for Stopping:               Discharge Procedure Orders  SLING ORTHOPEDIC LARGE FOR HOME USE     Diet general     Shower on day dressing removed (No bath)     Ice to affected area     Lifting restrictions     No driving, operating heavy equipment or signing legal documents while taking pain medication     Call MD for:  temperature >100.4     Call MD for:  persistent nausea and vomiting     Call MD for:  severe uncontrolled pain     Call MD for:  difficulty breathing, headache or visual disturbances     Call MD for:  redness, tenderness, or signs of infection (pain, swelling, redness, odor or green/yellow discharge around incision site)     Call MD for:  hives     Call MD for:  persistent dizziness or light-headedness     Call MD for:  extreme fatigue     Remove dressing in 48 hours

## 2017-05-23 NOTE — TRANSFER OF CARE
"Anesthesia Transfer of Care Note    Patient: Devon Leon    Procedure(s) Performed: Procedure(s) (LRB):  REPAIR ROTATOR CUFF ARTHROSCOPIC (Right)  ARTHROSCOPY-SHOULDER WITH SUBACROMIAL DECOMPRESSION, possible superior capsule reconstruction (Right)    Patient location: PACU    Anesthesia Type: general    Transport from OR: Transported from OR on room air with adequate spontaneous ventilation    Post pain: adequate analgesia    Post assessment: no apparent anesthetic complications    Post vital signs: stable    Level of consciousness: awake and sedated          Last vitals:   Visit Vitals  /73 (BP Location: Left arm, Patient Position: Lying, BP Method: Automatic)   Pulse 70   Temp 36.4 °C (97.5 °F) (Skin)   Resp 14   Ht 5' 9" (1.753 m)   Wt 89.4 kg (197 lb)   SpO2 100%   BMI 29.09 kg/m²     "

## 2017-05-24 VITALS
TEMPERATURE: 98 F | HEIGHT: 69 IN | DIASTOLIC BLOOD PRESSURE: 83 MMHG | RESPIRATION RATE: 18 BRPM | WEIGHT: 197 LBS | HEART RATE: 54 BPM | SYSTOLIC BLOOD PRESSURE: 162 MMHG | OXYGEN SATURATION: 100 % | BODY MASS INDEX: 29.18 KG/M2

## 2017-05-24 NOTE — OP NOTE
DATE OF PROCEDURE:  05/23/2017.    PREOPERATIVE DIAGNOSIS:  Full-thickness rotator cuff tear, right shoulder.    POSTOPERATIVE DIAGNOSIS:  Full-thickness rotator cuff tear, right shoulder.    PROCEDURES:  Arthroscopic subacromial decompression and arthroscopic rotator   cuff repair, right shoulder.    SURGEON:  Isaac Carpenter M.D.    FIRST ASSISTANT:  KARY Francis    ANESTHESIA:  General with an indwelling supraclavicular nerve sheath catheter.    ESTIMATED BLOOD LOSS:  Minimal.    FLUIDS:  Per Anesthesia record.    TOURNIQUET TIME:  None.    SPECIMENS:  None.    DRAINS:  None.    COMPLICATIONS:  None.    DESCRIPTION OF THE OPERATION:  After supraclavicular nerve sheath catheter was   placed in the holding area, the patient was brought to the Operating Room and   placed supine on the operating room table.  After successful induction of   general anesthesia, examination of the right shoulder was performed.  Range of   motion was full to forward elevation, abduction, internal and external rotation.    No evidence of instability or adhesive capsulitis noted.  The patient was then   placed in the left lateral decubitus position, right shoulder up, axillary roll   placed in the left axilla, and lower extremities padded to prevent neurapraxia   or pressure necrosis.  The right shoulder and upper extremity were then placed   in 10 pounds of balance suspension with the arm abducted 40 degrees and forward   flexed 10 degrees.  Head and neck placed in neutral alignment, well padded and   protected.  The right shoulder and upper extremity were then sterilely prepped   and draped in the usual fashion.  After appropriate timeout, 30 mL of a   1:100,000 epinephrine solution was injected into the subacromial space.  A   30-degree arthroscope was inserted into the glenohumeral joint via a standard   posterior portal.  Systematic diagnostic arthroscopy was performed.  Of   immediate notice, there was a significant  full-thickness rotator cuff tear.    This appeared U or V-shaped and was split and retracted near the level of the   glenoid.  Biceps showed some mild fraying, approximately 10-20%.  Glenohumeral   articular surfaces showed minimal wear.  There was mild fraying of the anterior   and posterior labrums; however, they appeared to be stable.  An anterior portal   was made under direct visualization.  Gentle debridement of the undersurface of   the rotator cuff was performed.  At this point, a 30-degree arthroscope was   placed in the subacromial space through the same posterior portal site.  Lateral   portal was made under direct visualization.  Bursectomy was performed.  There   was noted to be a moderate anterior acromial spur, and it was elected to perform   formal subacromial decompression.  Using the electrocautery, coracoacromial   ligament was subperiosteally dissected off the anterior acromion and an anterior   acromioplasty performed using a motorized shaver and bur.  Acromioclavicular   joint was not violated.  At this point, an accessory high lateral portal was   made, and through the high lateral portal and the anterior portal, graspers were   then placed, grabbing the anterior and posterior extent of the tear.  There was   noted to be a V-shaped tear, which would close nicely with margin convergence   sutures and anchors out at the articular margin.  Therefore, it was elected to   perform primary rotator cuff repair.  Two 5.5 mm helical anchors were placed in   the center of the tear of the anterior and posterior leaflets at the articular   anterior and posterior leaflets.  One was placed at the articular margin and one   was placed slightly laterally.  Using [employer] ideal suture passer, mattress   sutures were then passed at the level of the articular margin and both in the   more medial anchor.  Then, with traction on the sutures to the lateral portal,   margin convergence sutures were placed from  lateral to medial through the   rotator cuff tear all the way to the apex of the tear medially.  Sutures were   passed from lateral to medial, but then tied medial to lateral using   arthroscopic knot-tying techniques.  Finally, through the more lateral rotator   cuff anchor, simple stitches were placed, one anterior and one posterior, to   secure the leaflet.  Finally, a 4.5 mm Pop[employer]ok anchor was then placed laterally   and one limb from each of the four knot stacks were passed through the anchor to   lay the knot stacks flat and take tension off the medial row.  Upon completion   of the repair, arthroscopic visualization from both the glenohumeral joint and   subacromial space confirmed an excellent repair, which was stable and without   undue tension.  At this point, arthroscopic instruments were removed.    Arthroscopic portals were closed using 4-0 nylon in horizontal mattress fashion.    Sterile dressing was applied.  The patient was taken out of balance suspension   and placed in a sling.  Cold temp pad applied to the shoulder.  The patient was   then successfully awoken from general anesthesia and taken to Recovery in   stable condition.  Sponge and needle counts were reported as correct.  No   complications.      WAQAS  dd: 05/23/2017 17:35:06 (CDT)  td: 05/24/2017 18:19:28 (CDT)  Doc ID   #[id number]  Job ID #[id number]    CC:

## 2017-05-24 NOTE — ANESTHESIA POST-OP PAIN MANAGEMENT
Acute Pain Service Progress Note    Devon Leon is a 57 y.o., male, [id number].    Surgery:  Shoulder arthroscopy     Post Op Day #: 1    Catheter type: perineural  interscalene    Infusion type: Ropivacaine 0.2%  8 basal      Subjective:     General appearance of alert, oriented, no complaints   Pain with rest: very little pain   Assessment:     Pain control adequate    Plan:     Patient doing well, continue present treatment.    Evaluator Jacquelin Woods

## 2017-05-24 NOTE — ANESTHESIA POSTPROCEDURE EVALUATION
"Anesthesia Post Evaluation    Patient: Devon Leon    Procedure(s) Performed: Procedure(s) (LRB):  REPAIR ROTATOR CUFF ARTHROSCOPIC (Right)  ARTHROSCOPY-SHOULDER WITH SUBACROMIAL DECOMPRESSION (Right)    Final Anesthesia Type: general  Patient location during evaluation: PACU  Patient participation: Yes- Able to Participate  Level of consciousness: awake and alert and oriented  Post-procedure vital signs: reviewed and stable  Pain management: adequate  Airway patency: patent  PONV status at discharge: No PONV  Anesthetic complications: no      Cardiovascular status: hemodynamically stable and blood pressure returned to baseline  Respiratory status: unassisted, spontaneous ventilation and room air  Hydration status: euvolemic  Follow-up not needed.        Visit Vitals  BP (!) 162/83   Pulse (!) 54   Temp 36.5 °C (97.7 °F) (Temporal)   Resp 18   Ht 5' 9" (1.753 m)   Wt 89.4 kg (197 lb)   SpO2 100%   BMI 29.09 kg/m²       Pain/Saba Score: Pain Assessment Performed: Yes (5/23/2017  6:00 PM)  Presence of Pain: denies (5/23/2017  6:00 PM)  Pain Rating Prior to Med Admin: 0 (5/23/2017  6:00 PM)  Saba Score: 10 (5/23/2017  5:34 PM)      "

## 2017-05-25 NOTE — ANESTHESIA POST-OP PAIN MANAGEMENT
Acute Pain Service Progress Note    Devon Leon is a 57 y.o., male, [id number].    Surgery:  Shoulder arthroscopy    Post Op Day #: 2    Catheter type: perineural  interscalene    Infusion type: Ropivacaine 0.2%  8 basal    Subjective:     General appearance of alert, oriented, no complaints   Pain with rest: 5    Numbers   Pain with movement: 5    Numbers   Side Effects    1. Pruritis Yes, mild under tegaderms    2. Nausea No    3. Motor Blockade No,     4. Sedation No, 1=awake and alert      Assessment:     Pain control adequate    Plan:     Discontinue present therapy. Analgesia to be provided by the primary team, Dr. Carpenter. Patient will remove catheter this afternoon. Has number to call if any issues arise. Signing off service.    Evaluator Jacquelin Woods

## 2017-05-25 NOTE — ADDENDUM NOTE
Addendum  created 05/25/17 1054 by Jacquelin Woods MD    Anesthesia Event edited, Sign clinical note

## 2017-05-30 DIAGNOSIS — Z98.890 S/P ARTHROSCOPY: Primary | ICD-10-CM

## 2017-06-01 ENCOUNTER — OFFICE VISIT (OUTPATIENT)
Dept: ORTHOPEDICS | Facility: CLINIC | Age: 57
End: 2017-06-01
Payer: OTHER MISCELLANEOUS

## 2017-06-01 ENCOUNTER — HOSPITAL ENCOUNTER (OUTPATIENT)
Dept: RADIOLOGY | Facility: HOSPITAL | Age: 57
Discharge: HOME | End: 2017-06-01
Attending: ORTHOPAEDIC SURGERY
Payer: OTHER MISCELLANEOUS

## 2017-06-01 VITALS — WEIGHT: 197 LBS | BODY MASS INDEX: 29.18 KG/M2 | HEIGHT: 69 IN

## 2017-06-01 DIAGNOSIS — Z98.890 S/P ARTHROSCOPY: ICD-10-CM

## 2017-06-01 DIAGNOSIS — Z98.890 S/P ARTHROSCOPY: Primary | ICD-10-CM

## 2017-06-01 DIAGNOSIS — M75.121 COMPLETE TEAR OF RIGHT ROTATOR CUFF: ICD-10-CM

## 2017-06-01 PROCEDURE — 99999 PR PBB SHADOW E&M-EST. PATIENT-LVL II: CPT | Mod: PBBFAC,,, | Performed by: ORTHOPAEDIC SURGERY

## 2017-06-01 PROCEDURE — 99024 POSTOP FOLLOW-UP VISIT: CPT | Mod: S$GLB,,, | Performed by: ORTHOPAEDIC SURGERY

## 2017-06-01 PROCEDURE — 73030 X-RAY EXAM OF SHOULDER: CPT | Mod: 26,RT,, | Performed by: RADIOLOGY

## 2017-06-01 PROCEDURE — 73030 X-RAY EXAM OF SHOULDER: CPT | Mod: TC,PO,RT

## 2017-06-01 NOTE — PROGRESS NOTES
"DATE: 6/1/2017  PATIENT: Devon Leon    Attending Physician: Isaac Carpenter M.D.    HISTORY:  Devon Leon is a 57 y.o. male who returns for follow up evaluation of  his right shoulder.  He is status post subacromial decompression and rotator cuff repair, 5/23/17.  He states his pain is 6/10.  Denies any fevers or chills.  He has been using his sling.    PMH/PSH/FamHx/SocHx:  Reviewed and unchanged from prior visit    ROS:  Constitution: Negative for chills, fever, and sweats. Negative for unexplained weight loss.  HENT: Negative for headaches and blurry vision.   Cardiovascular: Negative for chest pain, irregular heartbeat, leg swelling and palpitations.   Respiratory: Negative for cough and shortness of breath.   Gastrointestinal: Negative for abdominal pain, heartburn, nausea and vomiting.   Genitourinary: Negative for bladder incontinence and dysuria.   Musculoskeletal: Negative for systemic arthritis, joint swelling, muscle weakness and myalgias.   Neurological: Negative for numbness.   Psychiatric/Behavioral: Negative for depression.   Endocrine: Negative for polyuria.   Hematologic/Lymphatic: Negative for bleeding disorders.  Skin: Negative for poor wound healing.       EXAM:  Ht 5' 9" (1.753 m)   Wt 89.4 kg (197 lb)   BMI 29.09 kg/m²   Healthy-appearing male in no acute distress.  Examination of the right shoulder reveals the arthroscopic portals are clean and dry.  Sensation intact the lateral upper arm and lateral forearm.  Range of motion strength not tested    IMAGING:   X-rays of the right shoulder performed and reviewed.  Status post acromioplasty with adequate decompression.    ASSESSMENT:  Status post ASD, ARCR right shoulder, 5/23/17.    PLAN:  The implications of the patient's evolution of symptoms and findings were explained to the patient in detail.  Devon is doing well postoperatively.  Sutures are removed.  I've shown him his arthroscopic photos.  I've instructed him on gentle pendulum " exercises.  Activities will be limited.  Follow-up in 4 weeks' exam and we'll start formal therapy at that time.    This note was dictated using voice recognition software and may contain grammatical errors

## 2017-06-29 ENCOUNTER — OFFICE VISIT (OUTPATIENT)
Dept: ORTHOPEDICS | Facility: CLINIC | Age: 57
End: 2017-06-29
Payer: OTHER MISCELLANEOUS

## 2017-06-29 VITALS — BODY MASS INDEX: 29.18 KG/M2 | WEIGHT: 197 LBS | HEIGHT: 69 IN

## 2017-06-29 DIAGNOSIS — Z98.890 S/P ARTHROSCOPY: ICD-10-CM

## 2017-06-29 DIAGNOSIS — Z98.890 S/P ROTATOR CUFF REPAIR: Primary | ICD-10-CM

## 2017-06-29 PROCEDURE — 99999 PR PBB SHADOW E&M-EST. PATIENT-LVL III: CPT | Mod: PBBFAC,,, | Performed by: ORTHOPAEDIC SURGERY

## 2017-06-29 PROCEDURE — 99024 POSTOP FOLLOW-UP VISIT: CPT | Mod: S$GLB,,, | Performed by: ORTHOPAEDIC SURGERY

## 2017-06-29 NOTE — PROGRESS NOTES
"DATE: 6/29/2017  PATIENT: Devon Leon    Attending Physician: Isaac Carpenter M.D.    HISTORY:  Devon Leon is a 57 y.o. male who returns for follow up evaluation of  his right shoulder.  Status post subacromial decompression rotator cuff repair, 5/23/17.  He reports that his pain is improving.  He does still have some discomfort at night and rates his pain at 5/10.  He is been performing pendulum exercises but limiting his activities.  He is been out of work since his surgery    PMH/PSH/FamHx/SocHx:  Reviewed and unchanged from prior visit    ROS:  Constitution: Negative for chills, fever, and sweats. Negative for unexplained weight loss.  HENT: Negative for headaches and blurry vision.   Cardiovascular: Negative for chest pain, irregular heartbeat, leg swelling and palpitations.   Respiratory: Negative for cough and shortness of breath.   Gastrointestinal: Negative for abdominal pain, heartburn, nausea and vomiting.   Genitourinary: Negative for bladder incontinence and dysuria.   Musculoskeletal: Negative for systemic arthritis, joint swelling, muscle weakness and myalgias.   Neurological: Negative for numbness.   Psychiatric/Behavioral: Negative for depression.   Endocrine: Negative for polyuria.   Hematologic/Lymphatic: Negative for bleeding disorders.  Skin: Negative for poor wound healing.       EXAM:  Ht 5' 9" (1.753 m)   Wt 89.4 kg (197 lb)   BMI 29.09 kg/m²   Healthy-appearing male in no acute distress.  Examination of the right shoulder reveals the arthroscopic portals are healed.  Sensation is intact in lateral upper arm and lateral forearm.  Range of motion shows passive motion of 100° forward elevation and 80° abduction.  Motor strength not tested today    IMAGING:   No new x-rays are performed today.    ASSESSMENT:  Status post ASD, ARCR right shoulder, 5/23/17.    PLAN:  The implications of the patient's evolution of symptoms and findings were explained to the patient . Al and his wife in " detail.  Devon is doing well postoperatively.  Formal therapy will now be initiated.  Remain out of work for 4 more weeks.  I will see him back in 4 weeks' time for reexam.      This note was dictated using voice recognition software and may contain grammatical errors

## 2017-07-11 ENCOUNTER — TELEPHONE (OUTPATIENT)
Dept: ORTHOPEDICS | Facility: CLINIC | Age: 57
End: 2017-07-11

## 2017-07-11 NOTE — TELEPHONE ENCOUNTER
----- Message from Marlin Boswell sent at 7/11/2017 12:02 PM CDT -----  Contact: Patient  Patient called to speak with the nurse; he wants to know what it will take for him to get released so that he can go back to work. He can be contacted at [phone].    Thanks,  Marlin

## 2017-07-11 NOTE — TELEPHONE ENCOUNTER
Pt requesting to return to work. Advised per Dr Carpenter last office note he stated pt would remain out of work for 4 more weeks. Pt feels that he could return to work sooner. Advised I would consult with Dr Carpenter tomorrow 7/12/17 as Dr Carpenter is in sx today. Informed pt I would contact pt with Dr Carpenter advise. Pt verbalized understanding.

## 2017-07-12 NOTE — TELEPHONE ENCOUNTER
Contacted pt. 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 Pt verbalized understanding. Pt states he will contact our office with fax number to his employer for work release.

## 2017-07-27 ENCOUNTER — OFFICE VISIT (OUTPATIENT)
Dept: ORTHOPEDICS | Facility: CLINIC | Age: 57
End: 2017-07-27
Payer: OTHER MISCELLANEOUS

## 2017-07-27 VITALS — BODY MASS INDEX: 29.18 KG/M2 | HEIGHT: 69 IN | WEIGHT: 197 LBS

## 2017-07-27 DIAGNOSIS — Z98.890 S/P ROTATOR CUFF REPAIR: Primary | ICD-10-CM

## 2017-07-27 PROCEDURE — 99999 PR PBB SHADOW E&M-EST. PATIENT-LVL III: CPT | Mod: PBBFAC,,, | Performed by: ORTHOPAEDIC SURGERY

## 2017-07-27 PROCEDURE — 99024 POSTOP FOLLOW-UP VISIT: CPT | Mod: S$GLB,,, | Performed by: ORTHOPAEDIC SURGERY

## 2017-07-27 NOTE — PROGRESS NOTES
"DATE: 7/27/2017  PATIENT: Devon Leon    Attending Physician: Isaac Carpenter M.D.    HISTORY:  Devon Leon is a 57 y.o. male who returns for follow up evaluation of  right shoulder.  He is status post subacromial decompression rotator cuff repair, 5/23/17.  He reports that his pain is improved and motion is getting better.  Pain is reported at 3/10 today.  Overall he reports improvement and is requesting a return back to work.    PMH/PSH/FamHx/SocHx:  Reviewed and unchanged from prior visit    ROS:  Constitution: Negative for chills, fever, and sweats. Negative for unexplained weight loss.  HENT: Negative for headaches and blurry vision.   Cardiovascular: Negative for chest pain, irregular heartbeat, leg swelling and palpitations.   Respiratory: Negative for cough and shortness of breath.   Gastrointestinal: Negative for abdominal pain, heartburn, nausea and vomiting.   Genitourinary: Negative for bladder incontinence and dysuria.   Musculoskeletal: Negative for systemic arthritis, joint swelling, muscle weakness and myalgias.   Neurological: Negative for numbness.   Psychiatric/Behavioral: Negative for depression.   Endocrine: Negative for polyuria.   Hematologic/Lymphatic: Negative for bleeding disorders.  Skin: Negative for poor wound healing.     PHYSICAL EXAM:  Ht 5' 9" (1.753 m)   Wt 89.4 kg (197 lb)   BMI 29.09 kg/m²   Healthy-appearing male in no acute distress.  Examination of the right shoulder reveals the arthroscopic portals are healed.  Range of motion is 160° of forward elevation, 160° of abduction, X rotation with the arm abducted 90° is near 90°, internal rotation to L3.  Motor strength 5 minus/5 in the supraspinatous and external rotators.  Negative impingement sign on exam.    IMAGING:    no x-rays are performed today.    ASSESSMENT:  Status post ASD, ARCR right shoulder, 5/23/17.    PLAN:  The implications of the patient's evolution of symptoms and findings were explained to the patient " in detail.  Devon is doing well postoperatively.  He'll continue with therapy working on increasing strength.  He may return to work but cautioned him to be careful and not to overdo it.  I will see him back in 2 months time for reexam      This note was dictated using voice recognition software and may contain grammatical errors

## 2017-08-02 ENCOUNTER — TELEPHONE (OUTPATIENT)
Dept: ORTHOPEDICS | Facility: CLINIC | Age: 57
End: 2017-08-02

## 2017-08-02 NOTE — TELEPHONE ENCOUNTER
----- Message from Faisal Daniels sent at 8/2/2017  3:36 PM CDT -----  Contact: patient  Patient called stated that Dr. Carpenter was suppose to fax a release of duty to return to work.fax to [phone].if any questions call back at [phone]. Thanks,

## 2017-09-28 ENCOUNTER — OFFICE VISIT (OUTPATIENT)
Dept: ORTHOPEDICS | Facility: CLINIC | Age: 57
End: 2017-09-28
Payer: OTHER MISCELLANEOUS

## 2017-09-28 VITALS — HEIGHT: 69 IN | WEIGHT: 197 LBS | BODY MASS INDEX: 29.18 KG/M2

## 2017-09-28 DIAGNOSIS — Z98.890 S/P ROTATOR CUFF REPAIR: Primary | ICD-10-CM

## 2017-09-28 PROCEDURE — 99999 PR PBB SHADOW E&M-EST. PATIENT-LVL II: CPT | Mod: PBBFAC,,, | Performed by: ORTHOPAEDIC SURGERY

## 2017-09-28 PROCEDURE — 99213 OFFICE O/P EST LOW 20 MIN: CPT | Mod: S$GLB,,, | Performed by: ORTHOPAEDIC SURGERY

## 2017-09-28 PROCEDURE — 3008F BODY MASS INDEX DOCD: CPT | Mod: S$GLB,,, | Performed by: ORTHOPAEDIC SURGERY

## 2017-09-28 NOTE — PROGRESS NOTES
"DATE: 9/28/2017  PATIENT: Devon Leon    Attending Physician: Isaac Carpenter M.D.    HISTORY:  Devon Leon is a 57 y.o. male who returns for follow up evaluation of  his right shoulder.  He is status post subacromial decompression and rotator cuff repair, 5/23/17.  He reports that he is doing well.  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  Pain is reported at 2/10 today    PMH/PSH/FamHx/SocHx:  Reviewed and unchanged from prior visit    ROS:  Constitution: Negative for chills, fever, and sweats. Negative for unexplained weight loss.  HENT: Negative for headaches and blurry vision.   Cardiovascular: Negative for chest pain, irregular heartbeat, leg swelling and palpitations.   Respiratory: Negative for cough and shortness of breath.   Gastrointestinal: Negative for abdominal pain, heartburn, nausea and vomiting.   Genitourinary: Negative for bladder incontinence and dysuria.   Musculoskeletal: Negative for systemic arthritis, joint swelling, muscle weakness and myalgias.   Neurological: Negative for numbness.   Psychiatric/Behavioral: Negative for depression.   Endocrine: Negative for polyuria.   Hematologic/Lymphatic: Negative for bleeding disorders.  Skin: Negative for poor wound healing.       EXAM:  Ht 5' 9" (1.753 m)   Wt 89.4 kg (197 lb)   BMI 29.09 kg/m²   Dveon Leon is a well developed, well nourished male in no acute distress. Physical examination of the right shoulder evaluated the following:    Inspection, palpation and ROM of the cervical spine  Disc compression testing bilaterally  Inspection for swelling, ecchymosis, erythema, deformity and atrophy  Tenderness to palpation of the soft tissue and bony structures  Active and passive range of motion  Sensation of the shoulder and upper extremity  Motor strength in the deltoid, supraspinatus, internal rotators and external rotators  Impingement, apprehension, relocation and Speed's " tests  Upper extremity vascular exam (skin temp,color, capillary refill)  Inspection for pseudomotor signs    Remarkable findings included:  Well-healed arthroscopic portals.  Range of motion shoulders full to forward elevation, abduction, internal and external rotation.  Motor strength 5 minus/5 in the supraspinatous and external rotators.  Negative impingement sign on exam        IMAGING:   No x-rays are performed today.    ASSESSMENT:  Status post ASD, ARCR right shoulder, 5/23/17.    PLAN:  The implications of the patient's evolution of symptoms and findings were explained to the patient in detail.  Devon is doing well postoperatively.  Motion is excellent and he . has minimal pain.  I've encouraged him to work on endurance strength.  Activities may be progressed as tolerated.  Follow-up in 3 months time for reexam.      This note was dictated using voice recognition software. Please excuse any grammatical or typographical errors.

## 2017-12-28 ENCOUNTER — OFFICE VISIT (OUTPATIENT)
Dept: ORTHOPEDICS | Facility: CLINIC | Age: 57
End: 2017-12-28
Payer: OTHER MISCELLANEOUS

## 2017-12-28 VITALS — BODY MASS INDEX: 29.18 KG/M2 | WEIGHT: 197 LBS | HEIGHT: 69 IN

## 2017-12-28 DIAGNOSIS — Z98.890 S/P ROTATOR CUFF REPAIR: Primary | ICD-10-CM

## 2017-12-28 PROCEDURE — 99213 OFFICE O/P EST LOW 20 MIN: CPT | Mod: S$GLB,,, | Performed by: ORTHOPAEDIC SURGERY

## 2017-12-28 PROCEDURE — 99999 PR PBB SHADOW E&M-EST. PATIENT-LVL II: CPT | Mod: PBBFAC,,, | Performed by: ORTHOPAEDIC SURGERY

## 2017-12-28 NOTE — PROGRESS NOTES
"DATE: 12/28/2017  PATIENT: Devon Leon    Attending Physician: Isaac Carpenter M.D.    HISTORY:  Devon Leon is a 57 y.o. male who returns for follow up evaluation of  his right shoulder.  He is status post subacromial decompression and rotator cuff repair, 5/23/17.  He notes some stiffness in the morning but denies pain.  He rates his pain at 0/10.  He has returned to work full duty.    PMH/PSH/FamHx/SocHx:  Reviewed and unchanged from prior visit    ROS:  Constitution: Negative for chills, fever, and sweats. Negative for unexplained weight loss.  HENT: Negative for headaches and blurry vision.   Cardiovascular: Negative for chest pain, irregular heartbeat, leg swelling and palpitations.   Respiratory: Negative for cough and shortness of breath.   Gastrointestinal: Negative for abdominal pain, heartburn, nausea and vomiting.   Genitourinary: Negative for bladder incontinence and dysuria.   Musculoskeletal: Negative for systemic arthritis, joint swelling, muscle weakness and myalgias.   Neurological: Negative for numbness.   Psychiatric/Behavioral: Negative for depression.   Endocrine: Negative for polyuria.   Hematologic/Lymphatic: Negative for bleeding disorders.  Skin: Negative for poor wound healing.       EXAM:  Ht 5' 9" (1.753 m)   Wt 89.4 kg (197 lb)   BMI 29.09 kg/m²   Devon Leon is a well developed, well nourished male in no acute distress. Physical examination of the right shoulder evaluated the following:    Inspection, palpation and ROM of the cervical spine  Disc compression testing bilaterally  Inspection for swelling, ecchymosis, erythema, deformity and atrophy  Tenderness to palpation of the soft tissue and bony structures  Active and passive range of motion  Sensation of the shoulder and upper extremity  Motor strength in the deltoid, supraspinatus, internal rotators and external rotators  Impingement, apprehension, relocation and Speed's tests  Upper extremity vascular exam (skin " temp,color, capillary refill)  Inspection for pseudomotor signs    Remarkable findings included:  Arthroscopic portals are well-healed.  Range of motion full to forward elevation, abduction, internal and external rotation.  Sensation intact C5-T1.  Motor exam 5/5 in the supraspinatus and nearly 5/5 in the external rotators negative impingement sign on exam        IMAGING:   No x-rays are performed today.    ASSESSMENT:  Status post ASD, ARCR right shoulder, 5/23/17.    PLAN:  The implications of the patient's evolution of symptoms and findings were explained to the patient in detail.  Devon is done well postoperatively.  He does have mild external rotation weakness which I think should improve with continued endurance strengthening.  Activities may be performed as tolerated.  Should he have further problems, I will see him back.  Otherwise, follow-up as needed.          This note was dictated using voice recognition software. Please excuse any grammatical or typographical errors.

## (undated) DEVICE — ATHROSCOPIC HOOK ELECTRODE

## (undated) DEVICE — Device

## (undated) DEVICE — DRAPE PLASTIC U 60X72

## (undated) DEVICE — SUT 1 27IN PDS II VIO MONO

## (undated) DEVICE — GAUZE SPONGE 4X4 12PLY

## (undated) DEVICE — PUMP COLD THERAPY

## (undated) DEVICE — DRAPE STERI-DRAPE 1000 17X11IN

## (undated) DEVICE — ELECTRODE REM PLYHSV RETURN 9

## (undated) DEVICE — SET DECANTER MEDICHOICE

## (undated) DEVICE — SEE MEDLINE ITEM 152622

## (undated) DEVICE — SEE MEDLINE ITEM 146313

## (undated) DEVICE — NDL SPINAL 18GX3.5 SPINOCAN

## (undated) DEVICE — SUT 4-0 ETHILON 18 PS-2

## (undated) DEVICE — MAT QUICK 40X30 FLOOR FLUID LF

## (undated) DEVICE — SYR 30CC LUER LOCK

## (undated) DEVICE — DRESSING N ADH OIL EMUL 3X3

## (undated) DEVICE — LINER GLOVE POWDERFREE SZ 8.5

## (undated) DEVICE — BURR OVAL CUTTING 6 MM

## (undated) DEVICE — PACK SHOULDER

## (undated) DEVICE — BLADE SURG CARBON STEEL SZ11

## (undated) DEVICE — PENCIL ROCKER SWITCH 10FT CORD

## (undated) DEVICE — SOL IRR NACL .9% 3000ML

## (undated) DEVICE — PAD K-THERMIA 24IN X 60IN

## (undated) DEVICE — BLADE SHAVER GREAT WHITE 3.5MM

## (undated) DEVICE — TUBING 4-LEAD ARTHOSCOPY

## (undated) DEVICE — DRAPE STERI U-SHAPED 47X51IN

## (undated) DEVICE — GOWN SURG 2XL DISP TIE BACK

## (undated) DEVICE — DRAPE INCISE IOBAN 2 23X17IN

## (undated) DEVICE — PAD ABD 8X10 STERILE

## (undated) DEVICE — KIT TRACTION SHLDR ST DISP LF

## (undated) DEVICE — SEE MEDLINE ITEM 157131

## (undated) DEVICE — NEPTUNE 4 PORT MANIFOLD

## (undated) DEVICE — SEE MEDLINE ITEM 157128

## (undated) DEVICE — GLOVE BIOGEL SZ 8 1/2

## (undated) DEVICE — PAD SHOULDER CARE POLAR

## (undated) DEVICE — APPLICATOR CHLORAPREP ORN 26ML

## (undated) DEVICE — GRASPER SUTURE 45 DEGREE